# Patient Record
Sex: MALE | Race: OTHER | NOT HISPANIC OR LATINO | URBAN - METROPOLITAN AREA
[De-identification: names, ages, dates, MRNs, and addresses within clinical notes are randomized per-mention and may not be internally consistent; named-entity substitution may affect disease eponyms.]

---

## 2020-10-11 ENCOUNTER — EMERGENCY (EMERGENCY)
Facility: HOSPITAL | Age: 30
LOS: 1 days | Discharge: HOME | End: 2020-10-11
Attending: EMERGENCY MEDICINE | Admitting: EMERGENCY MEDICINE
Payer: MEDICAID

## 2020-10-11 VITALS
HEART RATE: 85 BPM | SYSTOLIC BLOOD PRESSURE: 150 MMHG | DIASTOLIC BLOOD PRESSURE: 92 MMHG | RESPIRATION RATE: 18 BRPM | TEMPERATURE: 98 F | OXYGEN SATURATION: 98 %

## 2020-10-11 PROCEDURE — 10060 I&D ABSCESS SIMPLE/SINGLE: CPT

## 2020-10-11 PROCEDURE — 99283 EMERGENCY DEPT VISIT LOW MDM: CPT | Mod: 25

## 2020-10-11 RX ORDER — AZTREONAM 2 G
1 VIAL (EA) INJECTION
Qty: 14 | Refills: 0
Start: 2020-10-11 | End: 2020-10-17

## 2020-10-11 RX ORDER — CEPHALEXIN 500 MG
1 CAPSULE ORAL
Qty: 21 | Refills: 0
Start: 2020-10-11 | End: 2020-10-17

## 2020-10-11 RX ORDER — IBUPROFEN 200 MG
600 TABLET ORAL ONCE
Refills: 0 | Status: COMPLETED | OUTPATIENT
Start: 2020-10-11 | End: 2020-10-11

## 2020-10-11 RX ORDER — ACETAMINOPHEN 500 MG
975 TABLET ORAL ONCE
Refills: 0 | Status: COMPLETED | OUTPATIENT
Start: 2020-10-11 | End: 2020-10-11

## 2020-10-11 RX ORDER — CEPHALEXIN 500 MG
500 CAPSULE ORAL ONCE
Refills: 0 | Status: COMPLETED | OUTPATIENT
Start: 2020-10-11 | End: 2020-10-11

## 2020-10-11 RX ADMIN — Medication 975 MILLIGRAM(S): at 11:34

## 2020-10-11 RX ADMIN — Medication 500 MILLIGRAM(S): at 11:35

## 2020-10-11 RX ADMIN — Medication 600 MILLIGRAM(S): at 11:35

## 2020-10-11 RX ADMIN — Medication 1 TABLET(S): at 11:35

## 2020-10-11 NOTE — ED ADULT NURSE REASSESSMENT NOTE - NS ED NURSE REASSESS COMMENT FT1
pt alert,oriented x3. I and d completed by md to facial abscess. meds given as ordered. dsd in place. will continue to monitor

## 2020-10-11 NOTE — ED PROVIDER NOTE - OBJECTIVE STATEMENT
30M with PMH of multiple abscesses presents to the ER with a facial abscess x several days. Abscess is approximately 2x2 cm and overlies his right chin. Endorses localized pain. Denies any other symptoms including fever, chills, trouble swallowing/breathing/eating. Went to his PCP's office today where he was referred to the ER for further evaluation. 30M with PMH of multiple abscesses presents to the ER with a facial abscess x several days. Abscess is approximately 2x2 cm and overlies his right perioral area/chin. Endorses localized pain. Denies any other symptoms including fever, chills, trouble swallowing/breathing/eating. Went to his PCP's office today where he was referred to the ER for further evaluation.    SINGH

## 2020-10-11 NOTE — ED PROVIDER NOTE - PATIENT PORTAL LINK FT
You can access the FollowMyHealth Patient Portal offered by [facility] by registering at the following website: [email or web address]. By joining [employer]’s FollowMyHealth portal, you will also be able to view your health information using other applications (apps) compatible with our system.

## 2020-10-11 NOTE — ED POST DISCHARGE NOTE - ADDITIONAL DOCUMENTATION
received call from patient that no abx at pharmacy- in rx writer bactrim and Keflex unsubmitted. discussed with ED team Dr. Eng who confirmed abx prescription . Rx sent through . pt no allergies.

## 2020-10-11 NOTE — ED PROVIDER NOTE - PHYSICAL EXAMINATION
Gen: AAOx3, non-toxic  Head: NCAT  HEENT: no signs of oral abscess or PTA, EOMI, oral mucosa moist, normal conjunctiva  Lung: CTAB, no respiratory distress, no wheezes/rhonchi/rales B/L, speaking in full sentences  CV: RRR, no murmurs, rubs or gallops  Abd: soft, NTND  MSK: no visible deformities  Neuro: No focal sensory or motor deficits  Skin: +approx 2x2 cm abscess over right chin, spontaneously draining small amounts of pus, no surrounding cellulitis   Psych: normal affect.     Reynold Ignacio PGY3 Gen: AAOx3, non-toxic  Head: NCAT  HEENT: no signs of intraoral/dental abscess or PTA, EOMI, oral mucosa moist, normal conjunctiva  Lung: CTAB, no respiratory distress, no wheezes/rhonchi/rales B/L, speaking in full sentences  CV: RRR, no murmurs, rubs or gallops  Abd: soft, NTND  MSK: no visible deformities  Neuro: No focal sensory or motor deficits  Skin: +approx 2x2 cm abscess over right chin with induration, spontaneously draining small amounts of pus, no surrounding cellulitis   Psych: normal affect.     SINGH

## 2020-10-11 NOTE — ED PROVIDER NOTE - NSFOLLOWUPINSTRUCTIONS_ED_ALL_ED_FT
Follow up with your PCP in 24-48 hours for a wound check.   Call infectious disease to make a follow up appointment as soon as possible (number included).   Take Keflex 1 pill  every 8 hours for 7 days.   Take Bactrim 1 pill every 12 hours for 7 days.   May take Tylenol and Motrin as directed on the bottle for pain control.   Return to the ER if you develop any new or worsening symptoms such as fever, spreading redness, persistent abscess, difficulty swallowing/breathing, chest pain, shortness of breath, numbness, weakness, abdominal pain, nausea, vomiting, or visual changes.

## 2020-10-11 NOTE — ED PROVIDER NOTE - CLINICAL SUMMARY MEDICAL DECISION MAKING FREE TEXT BOX
Single isolated facial abscess. No signs of systemic infection or surrounding cellulitis. Already spontaneously draining pus. Will assess for drainable pus collection with US to determine need for I&D. Discharge on oral abx vs. CDU for IV abx.

## 2020-10-11 NOTE — ED PROVIDER NOTE - NS ED ROS FT
ROS:  GENERAL: No fever, no chills  EYES: no change in vision  HEENT: no trouble swallowing, no trouble speaking  CARDIAC: no chest pain  PULMONARY: no cough, no shortness of breath  GI: no abdominal pain, no nausea, no vomiting, no diarrhea, no constipation  : No dysuria, no frequency, no change in appearance, or odor of urine  SKIN: +facial abscess   NEURO: no headache, no weakness  MSK: No joint pain    Reynold Ignacio PGY3

## 2020-10-11 NOTE — ED PROVIDER NOTE - NSFOLLOWUPCLINICS_GEN_ALL_ED_FT
[facility] - Infectious Disease  Infectious Disease  [address] beds at the [facility], [place] [address]  Phone: [phone]  Fax:   Follow Up Time:

## 2021-06-11 ENCOUNTER — EMERGENCY (EMERGENCY)
Facility: HOSPITAL | Age: 31
LOS: 1 days | Discharge: HOME | End: 2021-06-11
Attending: EMERGENCY MEDICINE | Admitting: EMERGENCY MEDICINE
Payer: MEDICAID

## 2021-06-11 VITALS
DIASTOLIC BLOOD PRESSURE: 82 MMHG | HEART RATE: 79 BPM | SYSTOLIC BLOOD PRESSURE: 134 MMHG | TEMPERATURE: 98 F | OXYGEN SATURATION: 100 % | RESPIRATION RATE: 16 BRPM

## 2021-06-11 PROCEDURE — 99284 EMERGENCY DEPT VISIT MOD MDM: CPT

## 2021-06-11 RX ORDER — FAMOTIDINE 10 MG/ML
1 INJECTION INTRAVENOUS
Qty: 10 | Refills: 0
Start: 2021-06-11 | End: 2021-06-15

## 2021-06-11 RX ORDER — DEXAMETHASONE 0.5 MG/5ML
1 ELIXIR ORAL
Qty: 1 | Refills: 0
Start: 2021-06-11 | End: 2021-06-11

## 2021-06-11 RX ORDER — DIPHENHYDRAMINE HCL 50 MG
25 CAPSULE ORAL ONCE
Refills: 0 | Status: COMPLETED | OUTPATIENT
Start: 2021-06-11 | End: 2021-06-11

## 2021-06-11 RX ORDER — FAMOTIDINE 10 MG/ML
20 INJECTION INTRAVENOUS ONCE
Refills: 0 | Status: COMPLETED | OUTPATIENT
Start: 2021-06-11 | End: 2021-06-11

## 2021-06-11 RX ORDER — DEXAMETHASONE 0.5 MG/5ML
4 ELIXIR ORAL ONCE
Refills: 0 | Status: COMPLETED | OUTPATIENT
Start: 2021-06-11 | End: 2021-06-11

## 2021-06-11 RX ORDER — FLUCONAZOLE 150 MG/1
1 TABLET ORAL
Qty: 2 | Refills: 0
Start: 2021-06-11

## 2021-06-11 RX ORDER — FLUCONAZOLE 150 MG/1
150 TABLET ORAL ONCE
Refills: 0 | Status: COMPLETED | OUTPATIENT
Start: 2021-06-11 | End: 2021-06-11

## 2021-06-11 RX ADMIN — FAMOTIDINE 20 MILLIGRAM(S): 10 INJECTION INTRAVENOUS at 10:30

## 2021-06-11 RX ADMIN — Medication 4 MILLIGRAM(S): at 10:30

## 2021-06-11 RX ADMIN — FLUCONAZOLE 150 MILLIGRAM(S): 150 TABLET ORAL at 10:30

## 2021-06-11 RX ADMIN — Medication 25 MILLIGRAM(S): at 10:30

## 2021-06-11 NOTE — ED PROVIDER NOTE - OBJECTIVE STATEMENT
Dr. 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  Pt thought he had poison ivy but is not sure.  His PMD gave him prednisone (40 mg daily for 5 days, last dose this morning) and he has been using OTC diphenhydramine and topical steroid with no improvement.  Today symptoms continued and so he came to ED.  No fever, CP/SOB, N/V/D or abdominal pain.  Pt denies any known new soaps, detergents, foods, lotions or other exposures.

## 2021-06-11 NOTE — ED PROVIDER NOTE - CLINICAL SUMMARY MEDICAL DECISION MAKING FREE TEXT BOX
32 yo male with no sig PMH, in ED with symptoms that appear to be allergic vs tinea corporis; very minimal response to steroids (PO and topical) from PMD.  Would consider tinea at this point, but will also continue to treat for allergic component.  Pt finished 5 days of PO steroids today, so will only give 2 more days of steroids (dexamethasone to last in system for longer); pt to be given first dose of meds in ED and then instructions for remainder of meds; advised to follow up with PMD within 2 weeks

## 2021-06-11 NOTE — ED PROVIDER NOTE - PHYSICAL EXAMINATION
skin: arms, torso and right temple with scattered scratch marks and interspersed ?small hives vs. tinea; some scratch marks with overlying scab; no erythema or obvious cellulitis

## 2021-06-11 NOTE — ED PROVIDER NOTE - PATIENT PORTAL LINK FT
You can access the FollowMyHealth Patient Portal offered by [facility] by registering at the following website: [email or web address]. By joining [email or web address]’s FollowMyHealth portal, you will also be able to view your health information using other applications (apps) compatible with our system.

## 2021-06-11 NOTE — ED PROVIDER NOTE - NSFOLLOWUPINSTRUCTIONS_ED_ALL_ED_FT
1. TAKE ALL MEDICATIONS ON DATES LISTED ON PRESCRIPTIONS, AS DIRECTED.  2. FOLLOW UP WITH YOUR PRIMARY DOCTOR WITHIN 2 WEEKS.  3. WASH AND DO NOT SHARE SHEETS AND TOWELS UNTIL ALL SKIN LESIONS ARE HEALED.  4. RETURN TO THE ER FOR ANY WORSENING SYMPTOMS OR CONCERNS.

## 2021-06-11 NOTE — ED ADULT TRIAGE NOTE - CHIEF COMPLAINT QUOTE
pt c/o poison ivy to malcolm arms, small patch noted to R temple region. pt denies SOB, vision problems. no past medical hx.

## 2021-06-11 NOTE — ED ADULT NURSE NOTE - OBJECTIVE STATEMENT
pt received to intake 1, a&ox 4, ambulatory, denies significant pmh, p/w suspected poison ivy rash to b/l hands, arms, chest and back since monday. Pt has been working on [employer]. pt endorses being seen by PCP and treated with prednisone and topical creams without improvement. PCP encouraged pt to come to ED. Pt reports itching. Redness and scratches noted. Pt breathing even and unlabored on room air. Denies fever, chills, cough, SOB, chest pain, palpitations, dizziness, N/V/D, constipation, numbness, tingling. Medications administered as ordered. pending dispo..

## 2022-02-22 ENCOUNTER — OUTPATIENT (OUTPATIENT)
Dept: OUTPATIENT SERVICES | Facility: HOSPITAL | Age: 32
LOS: 1 days | End: 2022-02-22
Payer: SELF-PAY

## 2022-02-22 DIAGNOSIS — Z11.52 ENCOUNTER FOR SCREENING FOR COVID-19: ICD-10-CM

## 2022-02-22 LAB — SARS-COV-2 RNA SPEC QL NAA+PROBE: SIGNIFICANT CHANGE UP

## 2022-02-22 PROCEDURE — 87635 SARS-COV-2 COVID-19 AMP PRB: CPT

## 2022-09-14 NOTE — ED ADULT NURSE NOTE - NSSUHOSCREENINGYN_ED_ALL_ED
Yes - the patient is able to be screened Oral Minoxidil Pregnancy And Lactation Text: This medication should only be used when clearly needed if you are pregnant, attempting to become pregnant or breast feeding.